# Patient Record
Sex: MALE | Race: WHITE | URBAN - METROPOLITAN AREA
[De-identification: names, ages, dates, MRNs, and addresses within clinical notes are randomized per-mention and may not be internally consistent; named-entity substitution may affect disease eponyms.]

---

## 2017-02-28 ENCOUNTER — HOSPITAL ENCOUNTER (EMERGENCY)
Facility: CLINIC | Age: 37
Discharge: HOME | End: 2017-02-28
Attending: EMERGENCY MEDICINE | Admitting: EMERGENCY MEDICINE
Payer: OTHER GOVERNMENT

## 2017-02-28 ENCOUNTER — APPOINTMENT (OUTPATIENT)
Dept: ULTRASOUND IMAGING | Facility: CLINIC | Age: 37
End: 2017-02-28
Attending: EMERGENCY MEDICINE
Payer: OTHER GOVERNMENT

## 2017-02-28 VITALS — TEMPERATURE: 98.1 F

## 2017-02-28 DIAGNOSIS — R10.32 GROIN PAIN, LEFT: ICD-10-CM

## 2017-02-28 LAB
ALBUMIN UR-MCNC: NEGATIVE MG/DL
APPEARANCE UR: CLEAR
BILIRUB UR QL STRIP: NEGATIVE
COLOR UR AUTO: YELLOW
GLUCOSE UR STRIP-MCNC: NEGATIVE MG/DL
HGB UR QL STRIP: NEGATIVE
HYALINE CASTS #/AREA URNS LPF: 1 /LPF (ref 0–2)
KETONES UR STRIP-MCNC: NEGATIVE MG/DL
LEUKOCYTE ESTERASE UR QL STRIP: NEGATIVE
MUCOUS THREADS #/AREA URNS LPF: PRESENT /LPF
NITRATE UR QL: NEGATIVE
PH UR STRIP: 6.5 PH (ref 5–7)
RBC #/AREA URNS AUTO: <1 /HPF (ref 0–2)
SP GR UR STRIP: 1.02 (ref 1–1.03)
URN SPEC COLLECT METH UR: ABNORMAL
UROBILINOGEN UR STRIP-MCNC: NORMAL MG/DL (ref 0–2)
WBC #/AREA URNS AUTO: 0 /HPF (ref 0–2)

## 2017-02-28 PROCEDURE — 81001 URINALYSIS AUTO W/SCOPE: CPT | Performed by: EMERGENCY MEDICINE

## 2017-02-28 PROCEDURE — 96374 THER/PROPH/DIAG INJ IV PUSH: CPT

## 2017-02-28 PROCEDURE — 25000128 H RX IP 250 OP 636: Performed by: EMERGENCY MEDICINE

## 2017-02-28 PROCEDURE — 93976 VASCULAR STUDY: CPT

## 2017-02-28 PROCEDURE — 99284 EMERGENCY DEPT VISIT MOD MDM: CPT | Mod: 25

## 2017-02-28 RX ORDER — HYDROCODONE BITARTRATE AND ACETAMINOPHEN 5; 325 MG/1; MG/1
1-2 TABLET ORAL EVERY 4 HOURS PRN
Qty: 15 TABLET | Refills: 0 | Status: SHIPPED | OUTPATIENT
Start: 2017-02-28

## 2017-02-28 RX ORDER — KETOROLAC TROMETHAMINE 30 MG/ML
30 INJECTION, SOLUTION INTRAMUSCULAR; INTRAVENOUS ONCE
Status: COMPLETED | OUTPATIENT
Start: 2017-02-28 | End: 2017-02-28

## 2017-02-28 RX ADMIN — KETOROLAC TROMETHAMINE 30 MG: 30 INJECTION, SOLUTION INTRAMUSCULAR at 09:23

## 2017-02-28 ASSESSMENT — ENCOUNTER SYMPTOMS
NAUSEA: 1
HEMATURIA: 1

## 2017-02-28 NOTE — LETTER
EMERGENCY DEPARTMENT  [address] [facility] [address]  [phone]    2017    Anmol Mora  [place]  [phone] (home)     : 1980      To Whom it may concern:    Anmol Mora was seen in our Emergency Department today, 2017.  I expect his condition to improve over the next 2 days.  He may return to work/school when improved.    Sincerely,        Lv Henson, DO

## 2017-02-28 NOTE — ED AVS SNAPSHOT
Emergency Department    [address] [place] [address]    Phone:  [phone]    Fax:  [phone]                                       Anmol Mora   MRN: [id number]    Department:   Emergency Department   Date of Visit:  2/28/2017           After Visit Summary Signature Page     I have received my discharge instructions, and my questions have been answered. I have discussed any challenges I see with this plan with the nurse or doctor.    ..........................................................................................................................................  Patient/Patient Representative Signature      ..........................................................................................................................................  Patient Representative Print Name and Relationship to Patient    ..................................................               ................................................  Date                                            Time    ..........................................................................................................................................  Reviewed by Signature/Title    ...................................................              ..............................................  Date                                                            Time

## 2017-02-28 NOTE — ED PROVIDER NOTES
History     Chief Complaint:    Hematuria, Testicular Pain    HPI   Anmol Mora is a 36 year old male status post vasectomy on 2/22 at [facility] who presents with testicular pain and hematuria. He called his urologist this morning and was told his symptoms are normal but his prescribed medications are not relieving his pain. The patient reports increasing scrotal bruising and left testicular pain over the past 36 hours. He has pain in the buttock radiating into his leg and pain with ambulation. He noted pink tinted urine this morning and is nauseated. He has been taking Oxycodone 5 mg every 6-8 hours and icing the area. His pain is currently 6/10.    Allergies:  No known drug allergies.      Medications:    Prednisone  Norco  Doxycycline     Past Medical History:    History reviewed.  No significant past medical history.      Past Surgical History:    Eye surgery  Vasectomy     Family History:    History reviewed. No pertinent family history.     Social History:  Marital Status:   Presents to the ED alone  Tobacco Use: Former smoker   Alcohol Use: Yes   Employed at [employer].       Review of Systems   Gastrointestinal: Positive for nausea.   Genitourinary: Positive for hematuria and testicular pain.   All other systems reviewed and are negative.      Physical Exam     Patient Vitals for the past 24 hrs:   BP Pulse Resp SpO2   02/28/17 0852 (P) 129/88 (P) 63 (P) 16 (P) 97 %           Physical Exam  General: Alert and cooperative with exam. Patient in mild distress. Normal mentation  Head:  Scalp is NC/AT  Eyes:  No scleral icterus, PERRL  ENT:  The external nose and ears are normal. The oropharynx is normal and without erythema; mucus membranes are moist.   Neck:  Normal range of motion without rigidity.   CV:  Regular rate  Resp:  Non-labored, no retractions or accessory muscle use  GI:  Abdomen is soft, no distension, no tenderness.   :  Normal external male genitalia. Circumcised. Moderate  tenderness to palpation of the left testicle. Normal orientation. Mild to moderate left sided scrotal bruising.  MS:  No lower extremity edema   Skin:  Warm and dry, No rash or lesions noted.  Neuro: Oriented x 3. No gross motor deficits.        Emergency Department Course     Imaging:  US Testicular & Scrotum w Doppler Ltd  Preliminary Result  IMPRESSION: Slight increased blood flow in the left testis when  compared to the right. Cannot exclude mild orchitis. Recommend  clinical correlation. Otherwise unremarkable.   Radiographic findings were communicated with the patient who voiced understanding of the findings.    Laboratory:  UA: Clear yellow urine, mucous urine present, otherwise WNL     Interventions:  Toradol 30 mg IV    Emergency Department Course:  Nursing notes and vitals reviewed.  I performed an exam of the patient as documented above.  Urine sample was obtained and sent for laboratory analysis, findings above.   The patient was sent for a US while in the emergency department, findings above.    Findings and plan explained to the patient. Patient discharged home with instructions regarding supportive care, medications, and reasons to return. The importance of close follow-up was reviewed. The patient was prescribed Norco.        Impression & Plan      Medical Decision Making:  Patient is a 36 year old male who presents with left sided groin pain and scrotal bruising as well as concern for hematuria: 6 days status post vasectomy. The patient's medical history and records were reviewed. Initial consideration for, but not limited to, hernia, infection, hematoma, testicular torsion, pain secondary to recent surgery/normal postoperative course, soft tissue injury, among others. UA obtained and unremarkable; no evidence of hematuria or infection. Ultrasound of scrotum/testicles with doppler demonstrates no evidence of torsion or other significant pathology. Patient was provided IM Toradol in the ED with noted  improvement in pain. Patient was prescribed a short course of Lortab for breakthrough pain. Recommended close follow up with patient's urologist. Return precautions discussed. At the time of discharged patient was in good/stable condition with well controlled pain.    Diagnosis:    ICD-10-CM    1. Groin pain, left R10.32        Disposition:  Discharge to home.     Discharge Medications:  New Prescriptions    HYDROCODONE-ACETAMINOPHEN (NORCO) 5-325 MG PER TABLET    Take 1-2 tablets by mouth every 4 hours as needed for moderate to severe pain         Garima Ceja  2/28/2017    EMERGENCY DEPARTMENT    JOSE ALBERTO, Garima Ceja, jack serving as a scribe on 2/28/2017 at 8:50 AM to personally document services performed by Dr. Henson based on my observations and the provider's statements to me.       Lv Henson,   02/28/17 2017

## 2017-02-28 NOTE — ED AVS SNAPSHOT
Emergency Department    [address] [place] [address]    Phone:  [phone]    Fax:  [phone]                                       Anmol Mora   MRN: [id number]    Department:   Emergency Department   Date of Visit:  2/28/2017           Patient Information     Date Of Birth          1980        Your diagnoses for this visit were:     Groin pain, left        You were seen by Lv Henson DO.      Follow-up Information     Follow up with Urology In 3 days.        Discharge Instructions       Return to the emergency department or seek medical care as instructed if your symptoms fail to improve or significantly worsen.    Take Tylenol or Lortab (prescription pain medicine) and/or ibuprofen as needed for symptom/pain relief; use as directed.    Ice area of pain for 20 minutes four times per day for the next two days    Follow-up as indicated on page 1.  Maintain adequate hydration and get plenty of rest.      24 Hour Appointment Hotline       To make an appointment at any [facility], call [phone] ([phone]). If you don't have a family doctor or clinic, we will help you find one. [facility] are conveniently located to serve the needs of you and your family.             Review of your medicines      START taking        Dose / Directions Last dose taken    HYDROcodone-acetaminophen 5-325 MG per tablet   Commonly known as:  NORCO   Dose:  1-2 tablet   Quantity:  15 tablet        Take 1-2 tablets by mouth every 4 hours as needed for moderate to severe pain   Refills:  0          Our records show that you are taking the medicines listed below. If these are incorrect, please call your family doctor or clinic.        Dose / Directions Last dose taken    ondansetron 4 MG tablet   Commonly known as:  ZOFRAN   Dose:  4 mg   Quantity:  10 tablet        Take 1 tablet (4 mg) by mouth every 6 hours as needed for nausea   Refills:  0         oxyCODONE-acetaminophen 5-325 MG per tablet   Commonly known as:  PERCOCET   Dose:  1-2 tablet   Quantity:  20 tablet        Take 1-2 tablets by mouth every 4 hours as needed for pain   Refills:  0                Prescriptions were sent or printed at these locations (1 Prescription)                   Other Prescriptions                Printed at Department/Unit printer (1 of 1)         HYDROcodone-acetaminophen (NORCO) 5-325 MG per tablet                Procedures and tests performed during your visit     UA with Microscopic reflex to Culture    US Testicular & Scrotum w [facility]      Orders Needing Specimen Collection     None      Pending Results     Date and Time Order Name Status Description    2/28/2017 0917 US Testicular & Scrotum w [employer] Preliminary             Pending Culture Results     No orders found from 2/26/2017 to 3/1/2017.             Test Results from your hospital stay     2/28/2017 10:31 AM - Interface, Flexilab Results      Component Results     Component Value Ref Range & Units Status    Color Urine Yellow  Final    Appearance Urine Clear  Final    Glucose Urine Negative NEG mg/dL Final    Bilirubin Urine Negative NEG Final    Ketones Urine Negative NEG mg/dL Final    Specific Gravity Urine 1.024 1.003 - 1.035 Final    Blood Urine Negative NEG Final    pH Urine 6.5 5.0 - 7.0 pH Final    Protein Albumin Urine Negative NEG mg/dL Final    Urobilinogen mg/dL Normal 0.0 - 2.0 mg/dL Final    Nitrite Urine Negative NEG Final    Leukocyte Esterase Urine Negative NEG Final    Source Midstream Urine  Final    WBC Urine 0 0 - 2 /HPF Final    RBC Urine <1 0 - 2 /HPF Final    Mucous Urine Present (A) NEG /LPF Final    Hyaline Casts 1 0 - 2 /LPF Final         2/28/2017 10:06 AM - Interface, Radiant Ib      Narrative     ULTRASOUND TESTICULAR AND SCROTUM WITH DOPPLER LIMITED February 28, 2017 9:52 AM     HISTORY: Left scrotal pain. Recent vasectomy on 2/22/2017.    COMPARISON: None.    FINDINGS:  Right testis is normal with normal blood flow. No testicular  masses. Right epididymis is normal. No hydrocele or varicocele.    The left testis is normal. No testicular masses. Slightly increased  blood flow when compared to the right. Epididymis is normal with  normal blood flow. No hydrocele or varicocele. No evidence of  spermatocele.        Impression     IMPRESSION: Slight increased blood flow in the left testis when  compared to the right. Cannot exclude mild orchitis. Recommend  clinical correlation. Otherwise unremarkable.                Clinical Quality Measure: Blood Pressure Screening     Your blood pressure was checked while you were in the emergency department today. The last reading we obtained was  BP: (P) 129/88 . Please read the guidelines below about what these numbers mean and what you should do about them.  If your systolic blood pressure (the top number) is less than 120 and your diastolic blood pressure (the bottom number) is less than 80, then your blood pressure is normal. There is nothing more that you need to do about it.  If your systolic blood pressure (the top number) is 120-139 or your diastolic blood pressure (the bottom number) is 80-89, your blood pressure may be higher than it should be. You should have your blood pressure rechecked within a year by a primary care provider.  If your systolic blood pressure (the top number) is 140 or greater or your diastolic blood pressure (the bottom number) is 90 or greater, you may have high blood pressure. High blood pressure is treatable, but if left untreated over time it can put you at risk for heart attack, stroke, or kidney failure. You should have your blood pressure rechecked by a primary care provider within the next 4 weeks.  If your provider in the emergency department today gave you specific instructions to follow-up with your doctor or provider even sooner than that, you should follow that instruction and not wait for up to 4 weeks  "for your follow-up visit.        Thank you for choosing [facility]       Thank you for choosing [facility] for your care. Our goal is always to provide you with excellent care. Hearing back from our patients is one way we can continue to improve our services. Please take a few minutes to complete the written survey that you may receive in the mail after you visit with us. Thank you!        [email or web address] Medical Information     [employer] lets you send messages to your doctor, view your test results, renew your prescriptions, schedule appointments and more. To sign up, go to [email or web address] . Click on \"Log in\" on the left side of the screen, which will take you to the Welcome page. Then click on \"Sign up Now\" on the right side of the page.     You will be asked to enter the access code listed below, as well as some personal information. Please follow the directions to create your username and password.     Your access code is: [email or web address]  Expires: 2017 11:34 AM     Your access code will  in 90 days. If you need help or a new code, please call your [facility] clinic or [phone].        Care EveryWhere ID     This is your Care EveryWhere ID. This could be used by other organizations to access your [facility] medical records  [id number]        After Visit Summary       This is your record. Keep this with you and show to your community pharmacist(s) and doctor(s) at your next visit.                  "

## 2017-02-28 NOTE — DISCHARGE INSTRUCTIONS
Return to the emergency department or seek medical care as instructed if your symptoms fail to improve or significantly worsen.    Take Tylenol or Lortab (prescription pain medicine) and/or ibuprofen as needed for symptom/pain relief; use as directed.    Ice area of pain for 20 minutes four times per day for the next two days    Follow-up as indicated on page 1.  Maintain adequate hydration and get plenty of rest.

## 2019-03-10 ENCOUNTER — HOSPITAL ENCOUNTER (EMERGENCY)
Dept: HOSPITAL 62 - ER | Age: 39
Discharge: HOME | End: 2019-03-10
Payer: COMMERCIAL

## 2019-03-10 VITALS — SYSTOLIC BLOOD PRESSURE: 144 MMHG | DIASTOLIC BLOOD PRESSURE: 86 MMHG

## 2019-03-10 DIAGNOSIS — M79.672: Primary | ICD-10-CM

## 2019-03-10 DIAGNOSIS — Z23: ICD-10-CM

## 2019-03-10 PROCEDURE — 99283 EMERGENCY DEPT VISIT LOW MDM: CPT

## 2019-03-10 PROCEDURE — 90715 TDAP VACCINE 7 YRS/> IM: CPT

## 2019-05-15 ENCOUNTER — HOSPITAL ENCOUNTER (EMERGENCY)
Dept: HOSPITAL 62 - ER | Age: 39
Discharge: HOME | End: 2019-05-15
Payer: COMMERCIAL

## 2019-05-15 VITALS — SYSTOLIC BLOOD PRESSURE: 130 MMHG | DIASTOLIC BLOOD PRESSURE: 70 MMHG

## 2019-05-15 DIAGNOSIS — M79.642: ICD-10-CM
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
## 2019-05-15 DIAGNOSIS — S69.92XA: Primary | ICD-10-CM

## 2019-05-15 PROCEDURE — 99283 EMERGENCY DEPT VISIT LOW MDM: CPT
